# Patient Record
Sex: FEMALE | Race: WHITE | URBAN - METROPOLITAN AREA
[De-identification: names, ages, dates, MRNs, and addresses within clinical notes are randomized per-mention and may not be internally consistent; named-entity substitution may affect disease eponyms.]

---

## 2017-09-01 ENCOUNTER — OFFICE VISIT (OUTPATIENT)
Dept: FAMILY MEDICINE | Facility: OTHER | Age: 62
End: 2017-09-01
Payer: COMMERCIAL

## 2017-09-01 VITALS
DIASTOLIC BLOOD PRESSURE: 84 MMHG | RESPIRATION RATE: 24 BRPM | HEART RATE: 84 BPM | SYSTOLIC BLOOD PRESSURE: 134 MMHG | OXYGEN SATURATION: 97 %

## 2017-09-01 DIAGNOSIS — Z53.9 ERRONEOUS ENCOUNTER--DISREGARD: ICD-10-CM

## 2017-09-01 DIAGNOSIS — R07.89 ATYPICAL CHEST PAIN: Primary | ICD-10-CM

## 2017-09-01 PROCEDURE — 93000 ELECTROCARDIOGRAM COMPLETE: CPT | Performed by: NURSE PRACTITIONER

## 2017-09-01 PROCEDURE — 99215 OFFICE O/P EST HI 40 MIN: CPT | Performed by: NURSE PRACTITIONER

## 2017-09-01 ASSESSMENT — PAIN SCALES - GENERAL
PAINLEVEL: MODERATE PAIN (4)
PAINLEVEL: SEVERE PAIN (6)

## 2017-09-01 NOTE — NURSING NOTE
Gifty Moy is a 62 year old female who presents with chest pain.     PRESENTING PROBLEM:  Chest pain    NURSING ASSESSMENT:  Patient complains of chest pain.  Onset:  Around 3:30 pm today  Pain is characterized as stabbing   Severity 7 out of 10  Located center of chest into center of back.    Radiates to left neck/jaw and right neck/jaw.  Duration constant.  Associated symptoms none.  Exacerbated by no known provoking events.  Relieved by 4 81 mg aspirin were given (1 at 4:37 pm and 3 at 4:58 pm per Abigail Prado CNP).  Pt's pain went down to a 4 and at 5:07 pm, just pressure, no pain.  Cardiac risk factors: none.  Allergies: Allergies not on file.  Denies allergies to medications    RECOMMENDED DISPOSITION:  Call 911   Will comply with recommendation: No- Barriers to comply with plan of care pt refused.   She had a car full of people out in the parking lot.  She wanted to  herself.  Explained to her that she would be putting herself, family in care and others on the road at risk.  Pt agreed to call her daughter to pick her up and bring her to the ER.  Pt eventually left and signed an AMA because she didn't want to wait for her daughter any longer.  If further questions/concerns or if symptoms do not improve, worsen or new symptoms develop, call your PCP or [facility] as soon as possible.      Guideline used: Chest Pain  Telephone Triage Protocols for Nurses, Fifth Edition, Rosa Elena Prado, MYRON Ruiz RN

## 2017-09-01 NOTE — PROGRESS NOTES
Chest Pain    Onset: 1.5 hours ago    Description:   Location:  Substernal radiating into back and neck          Character: sharp, burning and heavy    Duration, How long does pain last: 6/10 now     Intensity: moderate    Frequency (if intermittent):        Frequency between episodes:  States has had a couple episodes over past 2 weeks        Pain-free between episodes? yes    Precipitating and/or Alleviating factors:   Related to exertion: no  What type of activity will bring it on: none  Does it go away with rest in 3-5 minutes: NO    Worse with deep breaths or coughs: no  Related to food: no    Accompanying Signs & Symptoms:        Sweating: mild        Nausea/vomiting: no        Lightheadedness: YES          Palpitations: no        Cough: no        Fevers: no        Abdominal pain: no      History:        First degree family History of heart disease; unknown        Smoker: no    Progression of Symptoms: same    Therapies tried and outcome: non-Rx=asa 81 mg 4 tabsRx=ASA with minor relief  EKG was done. -done nsr    Patient states she has had a couple episodes of chest pain over past few weeks that have come and gone this one is not going away. She has taken rolaids with out relief.     [place] .   Discussed potential danger in driving at this time recommend going to ED via ambulance and calling someone to  family. She states that she does not want to go to ed in ambulance and is able to have her daughter pick her up her daughter is driving from [place]. I recommend that she take ambulance she is refusing this.   I called ED at [facility] as per her request. Report given to ED charge nurse.   Patient's daughter was unable to come pick her up at the clinic until later on this evening. Discussed risks to her health related to ongoing chest pain.   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 Patient states that she will go directly home and wait for her daughter to pick her up there.  patient left AMA form signed and discussed potential risks.     Abigail Prado CNP

## 2017-09-01 NOTE — MR AVS SNAPSHOT
"              After Visit Summary   2017    Gifty Moy    MRN: [id number]           Patient Information     Date Of Birth          1955        Visit Information        Provider Department      2017 5:00 PM Abigail Prado APRN CNP [facility]        Today's Diagnoses     Atypical chest pain    -  1    ERRONEOUS ENCOUNTER--DISREGARD           Follow-ups after your visit        Follow-up notes from your care team     Return if symptoms worsen or fail to improve.      Who to contact     If you have questions or need follow up information about today's clinic visit or your schedule please contact [facility] directly at [phone].  Normal or non-critical lab and imaging results will be communicated to you by MyChart, letter or phone within 4 business days after the clinic has received the results. If you do not hear from us within 7 days, please contact the clinic through MyChart or phone. If you have a critical or abnormal lab result, we will notify you by phone as soon as possible.  Submit refill requests through [employer] or call your pharmacy and they will forward the refill request to us. Please allow 3 business days for your refill to be completed.          Additional Information About Your Visit        MyChart Information     [employer] lets you send messages to your doctor, view your test results, renew your prescriptions, schedule appointments and more. To sign up, go to [email or web address] . Click on \"Log in\" on the left side of the screen, which will take you to the Welcome page. Then click on \"Sign up Now\" on the right side of the page.     You will be asked to enter the access code listed below, as well as some personal information. Please follow the directions to create your username and password.     Your access code is: [email or web address]  Expires: 2017  4:36 PM     Your access code will  in 90 days. If you need help or a new code, please " call your [facility] clinic or [phone].        Care EveryWhere ID     This is your Care EveryWhere ID. This could be used by other organizations to access your [facility] medical records  [id number]        Your Vitals Were     Pulse Respirations Pulse Oximetry             84 24 97%          Blood Pressure from Last 3 Encounters:   09/01/17 134/84    Weight from Last 3 Encounters:   No data found for Wt              We Performed the Following     EKG 12-lead complete w/read - Clinics          Today's Medication Changes          These changes are accurate as of: 9/1/17 11:59 PM.  If you have any questions, ask your nurse or doctor.               Start taking these medicines.        Dose/Directions    aspirin 81 MG tablet   Used for:  Atypical chest pain        Dose:  81 mg   Take 1 tablet (81 mg) by mouth once for 1 dose Give 4 tabs po   Quantity:  4 tablet   Refills:  0            Where to get your medicines      These medications were sent to [facility] - [address] [address]     Phone:  [phone]     aspirin 81 MG tablet                Primary Care Provider    None Specified       No primary provider on file.        Equal Access to Services     [facility]: Hadii diadnra lomax Sofam, waaxda luqadaha, qaybta kaalmaaramis bush, micha prakash. So [facility] [phone].    ATENCIÓN: Si habla español, tiene a contreras disposición servicios gratuitos de asistencia lingüística. Jennifer al [phone].    We comply with applicable federal civil rights laws and [place] laws. We do not discriminate on the basis of race, color, national origin, age, disability sex, sexual orientation or gender identity.            Thank you!     Thank you for choosing [facility]  for your care. Our goal is always to provide you with excellent care. Hearing back from our patients is one way we can continue to improve our services.  Please take a few minutes to complete the written survey that you may receive in the mail after your visit with us. Thank you!             Your Updated Medication List - Protect others around you: Learn how to safely use, store and throw away your medicines at www.disposemymeds.org.          This list is accurate as of: 9/1/17 11:59 PM.  Always use your most recent med list.                   Brand Name Dispense Instructions for use Diagnosis    aspirin 81 MG tablet     4 tablet    Take 1 tablet (81 mg) by mouth once for 1 dose Give 4 tabs po    Atypical chest pain

## 2017-09-05 ENCOUNTER — TELEPHONE (OUTPATIENT)
Dept: FAMILY MEDICINE | Facility: OTHER | Age: 62
End: 2017-09-05

## 2017-09-05 NOTE — TELEPHONE ENCOUNTER
Attempted to reach pt several times pt but only received busy signal.    Will try later.    Keri Ruiz RN

## 2017-09-05 NOTE — PROGRESS NOTES
Results discussed with patient in clinic. States understanding of these results.    Abigail Prado CNP

## 2017-09-05 NOTE — TELEPHONE ENCOUNTER
Please call Gifty and ask how she is doing after her episode of chest pain on Friday.     Thank you  Abigail Prado CNP

## 2017-09-06 NOTE — TELEPHONE ENCOUNTER
Attempted to reach pt today with listed phone number.  Today it rang but it said the number is not in service.  Will close at this time.    Keri Ruiz RN